# Patient Record
Sex: FEMALE | Race: WHITE | NOT HISPANIC OR LATINO
[De-identification: names, ages, dates, MRNs, and addresses within clinical notes are randomized per-mention and may not be internally consistent; named-entity substitution may affect disease eponyms.]

---

## 2017-10-01 ENCOUNTER — RX ONLY (OUTPATIENT)
Age: 27
Setting detail: RX ONLY
End: 2017-10-01

## 2019-01-28 ENCOUNTER — APPOINTMENT (RX ONLY)
Dept: URBAN - METROPOLITAN AREA CLINIC 157 | Facility: CLINIC | Age: 29
Setting detail: DERMATOLOGY
End: 2019-01-28

## 2019-01-28 DIAGNOSIS — D22 MELANOCYTIC NEVI: ICD-10-CM

## 2019-01-28 DIAGNOSIS — L81.4 OTHER MELANIN HYPERPIGMENTATION: ICD-10-CM

## 2019-01-28 DIAGNOSIS — Z80.8 FAMILY HISTORY OF MALIGNANT NEOPLASM OF OTHER ORGANS OR SYSTEMS: ICD-10-CM

## 2019-01-28 DIAGNOSIS — B36.0 PITYRIASIS VERSICOLOR: ICD-10-CM

## 2019-01-28 PROBLEM — D22.61 MELANOCYTIC NEVI OF RIGHT UPPER LIMB, INCLUDING SHOULDER: Status: ACTIVE | Noted: 2019-01-28

## 2019-01-28 PROBLEM — L85.3 XEROSIS CUTIS: Status: ACTIVE | Noted: 2019-01-28

## 2019-01-28 PROBLEM — D48.5 NEOPLASM OF UNCERTAIN BEHAVIOR OF SKIN: Status: ACTIVE | Noted: 2019-01-28

## 2019-01-28 PROBLEM — D22.22 MELANOCYTIC NEVI OF LEFT EAR AND EXTERNAL AURICULAR CANAL: Status: ACTIVE | Noted: 2019-01-28

## 2019-01-28 PROBLEM — D22.39 MELANOCYTIC NEVI OF OTHER PARTS OF FACE: Status: ACTIVE | Noted: 2019-01-28

## 2019-01-28 PROBLEM — D22.0 MELANOCYTIC NEVI OF LIP: Status: ACTIVE | Noted: 2019-01-28

## 2019-01-28 PROCEDURE — ? PRESCRIPTION SAMPLES PROVIDED

## 2019-01-28 PROCEDURE — ? ADDITIONAL NOTES

## 2019-01-28 PROCEDURE — ? COUNSELING

## 2019-01-28 PROCEDURE — ? PRESCRIPTION

## 2019-01-28 PROCEDURE — 99213 OFFICE O/P EST LOW 20 MIN: CPT

## 2019-01-28 RX ORDER — ECONAZOLE NITRATE 10 MG/G
AEROSOL, FOAM TOPICAL
Qty: 1 | Refills: 3 | Status: ERX | COMMUNITY
Start: 2019-01-28

## 2019-01-28 RX ADMIN — ECONAZOLE NITRATE: 10 AEROSOL, FOAM TOPICAL at 19:09

## 2019-01-28 ASSESSMENT — LOCATION DETAILED DESCRIPTION DERM
LOCATION DETAILED: LEFT INFERIOR POSTERIOR HELIX
LOCATION DETAILED: RIGHT LOWER CUTANEOUS LIP
LOCATION DETAILED: LEFT CHIN
LOCATION DETAILED: LEFT INFERIOR CENTRAL MALAR CHEEK
LOCATION DETAILED: NASAL DORSUM
LOCATION DETAILED: LEFT MEDIAL UPPER BACK
LOCATION DETAILED: RIGHT ANTERIOR SHOULDER

## 2019-01-28 ASSESSMENT — LOCATION ZONE DERM
LOCATION ZONE: NOSE
LOCATION ZONE: FACE
LOCATION ZONE: TRUNK
LOCATION ZONE: EAR
LOCATION ZONE: ARM
LOCATION ZONE: LIP

## 2019-01-28 ASSESSMENT — LOCATION SIMPLE DESCRIPTION DERM
LOCATION SIMPLE: LEFT CHEEK
LOCATION SIMPLE: NOSE
LOCATION SIMPLE: LEFT UPPER BACK
LOCATION SIMPLE: RIGHT LIP
LOCATION SIMPLE: LEFT EAR
LOCATION SIMPLE: CHIN
LOCATION SIMPLE: RIGHT SHOULDER

## 2019-01-28 NOTE — HPI: RASH (PIGMENTED PURPURA DERMATOSIS)
Is This A New Presentation, Or A Follow-Up?: Rash
Additional History: \\nPt states has been loosing pigment on back for 2 years. No treatment.

## 2019-01-28 NOTE — PROCEDURE: ADDITIONAL NOTES
Detail Level: Simple
Additional Notes: Photo Taken today
Additional Notes: Discussion and skin care instructions completed

## 2019-01-28 NOTE — HPI: SKIN LESION
Has Your Skin Lesion Been Treated?: not been treated
Is This A New Presentation, Or A Follow-Up?: Skin Lesion
Which Family Member (Optional)?: Mother
Additional History: \\nTanning bed history.